# Patient Record
(demographics unavailable — no encounter records)

---

## 2025-03-19 NOTE — PHYSICAL EXAM
[Chaperone Present] : A chaperone was present in the examining room during all aspects of the physical examination [Appropriately responsive] : appropriately responsive [Alert] : alert [No Acute Distress] : no acute distress [No Lymphadenopathy] : no lymphadenopathy [Soft] : soft [Non-tender] : non-tender [Non-distended] : non-distended [No HSM] : No HSM [No Lesions] : no lesions [No Mass] : no mass [Oriented x3] : oriented x3 [Examination Of The Breasts] : a normal appearance [No Masses] : no breast masses were palpable [Labia Majora] : normal [Labia Minora] : normal [Normal] : normal [Enlarged ___ wks] : enlarged [unfilled] ~Uweeks [Anteversion] : anteverted [Uterine Adnexae] : normal [FreeTextEntry2] : magaly ovalle

## 2025-03-19 NOTE — PLAN
[FreeTextEntry1] : Patient with longstanding history of menometrorrhagia and adenomyosis.  With vaginal bleeding for 1 month's time. Will begin Lysteda as directed.  No no personal or family history of DVT or PE. Patient return to office 3/21/2025 and to see endometrial biopsy. After Endo C and endometrial biopsy patient will start Slynd oral contraceptive and will consider a Mirena IUD as recommended. Option of outpatient endometrial elation with or without IUD reviewed with patient in detail.   Observation only while not recommended was discussed with the patient. All questions were answered to the patient's satisfaction. Compliance with medical decision making and follow-up reviewed and stressed with patient.

## 2025-03-19 NOTE — HISTORY OF PRESENT ILLNESS
[FreeTextEntry1] : Patient is a 51-year-old here for gynecological follow-up noting vaginal bleeding continuously since the left.  2020. Patient with a longstanding history of irregular bleeding anemia and a low ferritin level and has been noncompliant with follow-up. Patient without any symptomatology at this time. Patient is overdue for mammogram and sonogram due at [facility]. Patient never had a colonoscopy. Patient had Endo see endometrial biopsy 2024  Patient with history of adenomyosis. Patient with history of  x 2,  x 1 with BTL

## 2025-03-21 NOTE — PROCEDURE
[TextEntry] : Patient here for an Endo seen endometrial biopsy for persistent irregular menses and anemia.  And low ferritin level.. Nature of Endo C and endometrial biopsy reviewed.  Patient has had Endo C and endometrial biopsy in the past. Patient has been taking Lysteda and has no bleeding at this time. Compliance with treatment plan reviewed again with patient.  Procedure note: Preoperative diagnosis: Menometrorrhagia anemia Postop diagnosis: Same Procedure: Hysteroscopy via Endo C and endometrial curettage with transabdominal ultrasound guidance Procedure # : 1160 Surgeon: Dr. Cristiano Hernandez Assistants: LORI Urrutia, Anna Rosenthal MA, Complications: None Estimated blood loss: Min Anesthesia: NSAID Note: Patient taken Lysteda Findings: Endometrial lining with anovulatory endometrium. Plan of Management: Patient will DC Lysteda at this time as with no vaginal bleeding and will begin Slynd oral contraceptive.  At this time patient wishes to continue with Slynd if response adequate. Mirena IUD reviewed with patient. Hospital procedure with ablation with without IUD Mirena reviewed with patient.  Consistent Procedure: After informed consent the patient brought to the examination room and an exam revealed a bulky 8 to 10-week anteverted uterus, parous cervix, no palpable adnexal masses.  The large speculum was placed in the vagina was prepped with a Betadine solution.  The cervix was then dilated with the insertion of an os finder.  The hysteroscope was then assembled and primed appropriately and placed through the uterine cervix to the mid uterine cavity without difficulty.  Transabdominal ultrasound was used to visualize the pelvis and get guide and ultrasonic guidance.  The uterus is visualized and is entirely contiguous either is adequate distention and good normalization.  No intra cavitary polyps or fibroids visualized.  There was some irregular endometrium consistent with anovulatory bleeding.  Both ostia visualized and normal.  Cervical canal was normal.  Hysteroscopy was completed uteruses drained of saline and an endometrial curettage was performed with suction aspiration.  And aspirated curettings were sent for pathologic evaluation.  The procedure was completed's entirety there was good hemostasis.  Patient was observed for 15 minutes prior to discharge home with instructions and follow-up in 2 weeks for telehealth.  Patient also will follow-up in 1 months for a removal of a skin tag.  Patient will continue on Slynd oral contraceptive at this time

## 2025-04-21 NOTE — PROCEDURE
[TextEntry] : Patient here for a removal of a right thigh/groin skin tag. Skin tag is present for 4 to 5 years and has not changed in size in recent memory. Consent signed and informed consent obtained.  Procedure: After informed consent the patient brought to the procedure room and a 0.75 x 0.5 skin tag was noted on the patient's right thigh slightly above the groin area. Area was prepped with Betadine, lidocaine injection was given 1-1/2 cc underneath the base of the skin tag. The cautery had bland of 44 was used to remove the skin tag and the base was cauterized and good hemostasis is noted. Antibiotic ointment and a Band-Aid was placed over the site of removal.  Patient was told to keep in place for 24 hours. Is currently on Slynd oral contraceptive for heavy vaginal bleeding with success prescription for Slynd was sent to the pharmacy. Patient return to office in 6 months Patient also states she she had a  at her PCP which was elevated for which she is going for pelvic MRI. Nature of cysts  and elevation is secondary to endometriosis and adenomyosis with the patient have explained.  Will follow-up patient status post MRI of pelvis.  Patient will call the office when the MRI pelvis done and results are received.